# Patient Record
Sex: MALE | Race: WHITE | NOT HISPANIC OR LATINO
[De-identification: names, ages, dates, MRNs, and addresses within clinical notes are randomized per-mention and may not be internally consistent; named-entity substitution may affect disease eponyms.]

---

## 2022-01-03 PROBLEM — Z00.00 ENCOUNTER FOR PREVENTIVE HEALTH EXAMINATION: Status: ACTIVE | Noted: 2022-01-03

## 2022-01-05 ENCOUNTER — NON-APPOINTMENT (OUTPATIENT)
Age: 27
End: 2022-01-05

## 2022-01-05 ENCOUNTER — APPOINTMENT (OUTPATIENT)
Dept: HEART AND VASCULAR | Facility: CLINIC | Age: 27
End: 2022-01-05
Payer: COMMERCIAL

## 2022-01-05 ENCOUNTER — APPOINTMENT (OUTPATIENT)
Age: 27
End: 2022-01-05

## 2022-01-05 VITALS
HEART RATE: 70 BPM | WEIGHT: 138 LBS | HEIGHT: 69 IN | SYSTOLIC BLOOD PRESSURE: 120 MMHG | BODY MASS INDEX: 20.44 KG/M2 | DIASTOLIC BLOOD PRESSURE: 74 MMHG | RESPIRATION RATE: 12 BRPM

## 2022-01-05 DIAGNOSIS — R06.02 SHORTNESS OF BREATH: ICD-10-CM

## 2022-01-05 DIAGNOSIS — R07.89 OTHER CHEST PAIN: ICD-10-CM

## 2022-01-05 DIAGNOSIS — K90.0 CELIAC DISEASE: ICD-10-CM

## 2022-01-05 DIAGNOSIS — Z78.9 OTHER SPECIFIED HEALTH STATUS: ICD-10-CM

## 2022-01-05 PROCEDURE — 93306 TTE W/DOPPLER COMPLETE: CPT

## 2022-01-05 PROCEDURE — ZZZZZ: CPT

## 2022-01-05 PROCEDURE — 99204 OFFICE O/P NEW MOD 45 MIN: CPT

## 2022-01-05 PROCEDURE — 93000 ELECTROCARDIOGRAM COMPLETE: CPT

## 2022-01-05 NOTE — DISCUSSION/SUMMARY
[FreeTextEntry1] : 1. Chest discomfort: Atypical; will obtain an EKG and an ETT and advise once results are known. Blood work.\par 2. Shortness of breath: Echocardiogram

## 2022-01-05 NOTE — PHYSICAL EXAM

## 2022-01-05 NOTE — HISTORY OF PRESENT ILLNESS
[FreeTextEntry1] : 26-year-old male with no significant past medical history comes in for evaluation of chest discomfort and shortness of breath. Patient reports one week of substernal nonradiating chest discomfort that comes and goes associated with shortness of breath occasionally on exertional activity and occasionally at rest no exacerbating or relieving factors. No PND or orthopnea.

## 2022-01-06 ENCOUNTER — NON-APPOINTMENT (OUTPATIENT)
Age: 27
End: 2022-01-06

## 2022-01-06 LAB
25(OH)D3 SERPL-MCNC: 33.1 NG/ML
ALBUMIN SERPL ELPH-MCNC: 5.2 G/DL
ALP BLD-CCNC: 68 U/L
ALT SERPL-CCNC: 14 U/L
ANION GAP SERPL CALC-SCNC: 12 MMOL/L
AST SERPL-CCNC: 16 U/L
BASOPHILS # BLD AUTO: 0.02 K/UL
BASOPHILS NFR BLD AUTO: 0.4 %
BILIRUB SERPL-MCNC: 0.3 MG/DL
BUN SERPL-MCNC: 16 MG/DL
CALCIUM SERPL-MCNC: 10.9 MG/DL
CHLORIDE SERPL-SCNC: 101 MMOL/L
CHOLEST SERPL-MCNC: 200 MG/DL
CO2 SERPL-SCNC: 26 MMOL/L
CREAT SERPL-MCNC: 0.82 MG/DL
EOSINOPHIL # BLD AUTO: 0.04 K/UL
EOSINOPHIL NFR BLD AUTO: 0.8 %
ESTIMATED AVERAGE GLUCOSE: 108 MG/DL
FOLATE SERPL-MCNC: 10.2 NG/ML
GLUCOSE SERPL-MCNC: 97 MG/DL
HBA1C MFR BLD HPLC: 5.4 %
HCT VFR BLD CALC: 46.2 %
HDLC SERPL-MCNC: 52 MG/DL
HGB BLD-MCNC: 14.9 G/DL
IMM GRANULOCYTES NFR BLD AUTO: 0.2 %
LDLC SERPL CALC-MCNC: 136 MG/DL
LYMPHOCYTES # BLD AUTO: 1.52 K/UL
LYMPHOCYTES NFR BLD AUTO: 31.5 %
MAGNESIUM SERPL-MCNC: 2 MG/DL
MAN DIFF?: NORMAL
MCHC RBC-ENTMCNC: 30.8 PG
MCHC RBC-ENTMCNC: 32.3 GM/DL
MCV RBC AUTO: 95.5 FL
MONOCYTES # BLD AUTO: 0.4 K/UL
MONOCYTES NFR BLD AUTO: 8.3 %
NEUTROPHILS # BLD AUTO: 2.84 K/UL
NEUTROPHILS NFR BLD AUTO: 58.8 %
NONHDLC SERPL-MCNC: 148 MG/DL
PHOSPHATE SERPL-MCNC: 2.8 MG/DL
PLATELET # BLD AUTO: 223 K/UL
POTASSIUM SERPL-SCNC: 5.6 MMOL/L
PROT SERPL-MCNC: 7.7 G/DL
RBC # BLD: 4.84 M/UL
RBC # FLD: 12.1 %
SODIUM SERPL-SCNC: 140 MMOL/L
T3FREE SERPL-MCNC: 3.42 PG/ML
T4 FREE SERPL-MCNC: 1.4 NG/DL
T4 SERPL-MCNC: 6.7 UG/DL
TRIGL SERPL-MCNC: 60 MG/DL
TSH SERPL-ACNC: 3.24 UIU/ML
VIT B12 SERPL-MCNC: 536 PG/ML
WBC # FLD AUTO: 4.83 K/UL

## 2023-01-25 ENCOUNTER — TRANSCRIPTION ENCOUNTER (OUTPATIENT)
Age: 28
End: 2023-01-25